# Patient Record
(demographics unavailable — no encounter records)

---

## 2024-11-04 NOTE — REASON FOR VISIT
[Initial Visit] : an initial visit for [FreeTextEntry2] : left clavicle/shoulder injury DOI: 11/03/2024

## 2024-11-04 NOTE — PHYSICAL EXAM
[de-identified] : General: Awake, alert, no acute distress, Patient was cooperative and appropriate during the examination.  The patient is of normal weight for height and age.  Walks without an antalgic gait.  Left shoulder Exam: Physical exam of the shoulder demonstrates normal skin without signs of skin changes or abnormalities. No erythema, warmth, or joint effusion appreciated.     Sensation intact light touch C5-T1 Palpable radial pulse Radial/ulnar/median/axillary/musculocutaneous/AIN/PIN nerves grossly intact   Range of motion: Forward Flexion: 160 Abduction: 130 External Rotation: 40 Internal Rotation: Lower lumbar level   Palpation: Not tender to palpation over the glenohumeral joint Moderately tender palpation over the rotator cuff insertion on the greater tuberosity Moderately tender to palpation over the AC joint Not tender to palpation of the biceps tendon/bicipital groove   Strength testing: Supraspinatus: 5/5 Infraspinatus: 5/5 Subscapularis: 5/5   Special test: Empty can test positive Pablo impingement test negative Speeds test negative Torrance's test negative Lift-off test negative Bell-press test negative Cross-arm adduction test positive   [de-identified] : X-rays 2 views of the left shoulder taken in the office today on 11/4/2024 shows no acute fracture or dislocation.

## 2024-11-04 NOTE — HISTORY OF PRESENT ILLNESS
[de-identified] : DOI: 11/03/2024 11/04/2024 : ERICKA OLIVERA  is a 48 year  old female who presents to the office for evaluation of the left shoulder.  She states last night she was up in the [place] and slipped and fell onto her left side and since then she has had pain and heard a crack and wanted to make sure she did not break something.  She states the pain is over the superior and lateral aspect of the shoulder and is worse certain movements and activities and alleviated with rest.  She is here for specialist opinion.  She denies any numbness or tingling distally.  She has no other complaints today.

## 2024-11-04 NOTE — DISCUSSION/SUMMARY
[de-identified] : Assessment: 48-year-old female with left AC joint sprain, rotator cuff strain   Plan: I had a long discussion with the patient today regarding the nature of their diagnosis and treatment plan. We discussed the risks and benefits of no treatment as well as nonoperative and operative treatments.  I reviewed the x-ray today that showed no acute bony pathology.  On examination today she has good range of motion and strength.  At this time I am recommending a trial of conservative treatment clued ice, heat, rest, over-the-counter anti-inflammatories, physical therapy on her own for symptomatic relief.  She was offered a prescription today but declined.  She will avoid exacerbating activities and will follow-up in 6 weeks as needed.  If symptoms were to persist we may consider advanced imaging in the future.  Patient seen and examined with Dr. Espino today.   The patient verbalizes their understanding and agrees with the plan.  All questions were answered to their satisfaction.

## 2025-06-06 NOTE — PROCEDURE
[de-identified] : Verbal consent was obtained and all questions, comments and concerns were addressed.  After right paraspinal trigger point in the affected area into superficial muscular trigger point was cleansed with alcohol prep X 3, ethyl chloride was used as local anesthetic.  Under sterile precautions 1cc of 1 percent lidocaine without epinephrine, plus 10 mg depomedrol, were instilled into the affected trigger points.  Patient tolerated procedure well. Dry sterile dressing was placed and patient described relief of pain 5 minutes after procedure was performed.

## 2025-06-06 NOTE — PHYSICAL EXAM
[Antalgic] : antalgic [de-identified] : CONSTITUTIONAL: The patient is a very pleasant individual who is well-nourished and who appears stated age. PSYCHIATRIC: The patient is alert and oriented X 3 and in no apparent distress, and participates with orthopedic evaluation well. HEAD: Atraumatic and is nonsyndromic in appearance. EENT: No visible thyromegaly, EOMI. RESPIRATORY: Respiratory rate is regular, not dyspneic on examination. LYMPHATICS: There is no inguinal lymphadenopathy INTEGUMENTARY: Skin is clean, dry, and intact about the bilateral lower extremities and lumbar spine. VASCULAR: There is brisk capillary refill about the bilateral lower extremities. NEUROLOGIC: There are no pathologic reflexes. There is no decrease in sensation of the bilateral lower extremities on manual  examination. Deep tendon reflexes are well maintained at 2+/4 of the bilateral lower extremities and are symmetric.. MUSCULOSKELETAL: There is no visible muscular atrophy. Manual motor strength is well maintained in the bilateral lower extremities. Range of motion of lumbar spine is well maintained. The patient ambulates in a non-myelopathic manner. Negative tension sign and straight leg raise bilaterally. Quad extension, ankle dorsiflexion, EHL, plantar flexion, and ankle eversion are well preserved. 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  AP pelvic x-ray and a left hip x-ray have been reviewed treatment of 3 views have been reviewed showing severe end-stage left hip degenerative joint disease

## 2025-06-06 NOTE — DISCUSSION/SUMMARY
[de-identified] : Patient tolerated a right paraspinal trigger point injection very well.  Oral steroids oral Toradol been prescribed physical therapy has also been prescribed.  Patient will follow-up on an as-needed basis with regard to lumbar myositis I think part of her lumbar myositis is also a component of an altered gait antalgic gait secondary to severe left hip arthritis she has been directed to the hip department here and/[facility] for discussion with regard to a left total hip replacement

## 2025-06-06 NOTE — HISTORY OF PRESENT ILLNESS
[de-identified] : Very pleasant 48-year-old female presents with an aggravation of underlying low back pain and known history of left degenerative joint disease of the hip.  She states her pain has been gradually getting worse over the last month or so.  She is helping her  recover from cervical myelopathy surgery so there is an increase in her repetitive bending lifting pulling twisting excetra no myelopathic signs and symptoms no lower extremity weakness or radiculopathy mainly right-sided low back pain and left inguinal crease pain/hip pain. [Worsening] : worsening [___ mths] : [unfilled] month(s) ago [4] : a current pain level of 4/10 [2] : an average pain level of 2/10 [1] : a minimum pain level of 1/10 [10] : a maximum pain level of 10/10 [Constant] : ~He/She~ states the symptoms seem to be constant [Walking] : worsened by walking [Weight Bearing] : worsened by weight bearing [Heat] : relieved by heat [NSAIDs] : relieved by nonsteroidal anti-inflammatory drugs [Rest] : relieved by rest [Ataxia] : no ataxia [Incontinence] : no incontinence [Loss of Dexterity] : good dexterity [Urinary Ret.] : no urinary retention

## 2025-07-28 NOTE — DISCUSSION/SUMMARY
[Medication Risks Reviewed] : Medication risks reviewed [Surgical risks reviewed] : Surgical risks reviewed [de-identified] : This is a 48 year old F pt presents today with bone-on-bone left hip osteoarthritis. The nature of condition and treatment options were discussed in detail. Patient is a candidate for left RAFI. I discussed the surgery with patient. She is interested in surgery in the future. At this time, I discussed conservative treatment such as cortisone injections, PT, anti-inflammatories, and low impact exercise. She has pain with prolonged sitting and standing. Patient defers from a IA cortisone injection. I sent a script for Toradol. She should continue to do low impact exercises. She may take Tylenol and NSAIDs as needed. F/u PRN.  The patient is a 48 year old individual with end stage arthritis of their right hip joint. The patient has exhausted a minimum of 3 months conservative treatment including prior injections, physical therapy, over the counter NSAIDS and pain medication where indicated. In addition, the patient's quality of life is diminished due to significant chronic pain. The patient is having difficulty with activities of daily living, including ambulating, descending stairs, and rising from a seated position. Based upon the patients continued symptoms and failure to respond to conservative treatment, I have recommended a right total hip replacement for this patient. A long discussion took place with the patient describing what a total joint replacement is and what the procedure would entail. A hip model, similar to the implant that will be used during the operation, was utilized to demonstrate and to discuss the various bearing surfaces of the implants. Implant fixation, press fit vs cemented, was also discussed with the patient. Bearing surfaces, including ceramic-on highly cross-linked polyethylene and metal on highly cross-linked polyethylene were discussed with the patient. Choices of implant manufacturers, mainly [employer] and [employer], were discussed and reviewed with preference to be made by patient and surgeon prior to operation. Final selection to be based on customary practice as well as preoperative templating with selection confirmed intraoperatively based on the patient's anatomy. The patient participated and agreed with the decision-making process. The hospitalization and post-operative care and rehabilitation were also discussed. The use of perioperative antibiotics and DVT prophylaxis were discussed. The risk, benefits and alternatives to surgical interventions were discussed at length with the patient. The patient was also advised of risks related to the medical comorbidities and elevated body mass index (BMI). A lengthy discussion took place to review the most common complications including but not limited to: deep vein thrombosis,pulmonary embolism, heart attack, stroke, infection, wound breakdown, numbness, damage to nerves, tendon, muscles, arteries or other blood vessels, death and other possible complications from anesthesia. The patient was told that we will take steps to minimize these risks by using sterile technique, antibiotics and DVT prophylaxis when appropriate and follow the patient postoperatively in the office setting to monitor progress. The possibility of recurrent pain, no improvement in pain and actual worsening of pain were also discussed with the patient.   The discharge plan of care focused on the patient going home following surgery. The patient was encouraged to make the necessary arrangements to have someone stay with them when they are discharged home. Following discharge, a home care therapist will visit the patient. The home care therapist will open your home care case and request home physical therapy services. Home physical therapy will commence following discharge provided it is appropriate and covered by the health insurance benefit plan.   The benefits of surgery were discussed with the patient including the potential for improving his/her current clinical condition through operative intervention. Alternatives to surgical intervention including continued conservative management were also discussed in detail. All questions were answered to the satisfaction of the patient. The treatment plan of care, as well as a model of a total hip equivalent to the one that will be used for their total joint replacement, was shared with the patient. The patient participated and agreed to the plan of care as well as the use of the recommended implants for their total hip replacement surgery.

## 2025-07-28 NOTE — END OF VISIT
[FreeTextEntry3] : Documented by Vinod Khan acting solely as a scribe for Dr. Mark Sanchez on this date 07/28/2025.   All Medical record entries made by the Scribe were at my, Dr. Mark Sanchez's, direction and personally dictated by me on 07/28/2025. I have reviewed the chart and agree that the record accurately reflects my personal performance of the history, physical exam, assessment and plan. I have also personally directed, reviewed, and agreed with the discharge instructions.

## 2025-07-28 NOTE — HISTORY OF PRESENT ILLNESS
[de-identified] : Patient is a 48-year-old female here for initial evaluation of left hip pain.  She reports she has had pain in the left hip for many years progressively worsening over the last 6 months.  She reports groin pain that radiates to the thigh and down the back of the leg that is worse with ambulation stairs and exercise. She does feel she walks with a limp which is causing some pain in her lower back. She reports the pain in her hip is affecting her activities of daily living.  She has pain with prolonged sitting and standing. She had Medrol dosepak for the pain which is helpful.

## 2025-07-28 NOTE — PHYSICAL EXAM
[de-identified] :  GENERAL APPEARANCE: Well nourished and hydrated, pleasant, alert, and oriented x 3. Appears their stated age.  HEENT: Normocephalic, extraocular eye motion intact. Nasal septum midline. Oral cavity clear. External auditory canal clear.  RESPIRATORY: Breath sounds clear and audible in all lobes. No wheezing, No accessory muscle use. CARDIOVASCULAR: No apparent abnormalities. No lower leg edema. No varicosities. Pedal pulses are palpable. NEUROLOGIC: Sensation is normal, no muscle weakness in the upper or lower extremities. DERMATOLOGIC: No apparent skin lesions, moist, warm, no rash. SPINE: Cervical spine appears normal and moves freely; thoracic spine appears normal and moves freely; lumbosacral spine appears normal and moves freely, normal, nontender. MUSCULOSKELETAL: Hands, wrists, and elbows are normal and move freely, shoulders are normal and move freely.        [de-identified] : Left hip exam positive Stinchfield maneuver range of motion flexion 100 degrees external rotation 45 internal rotation 15 degrees [de-identified] : Three-view imaging of the pelvis and left hip shows advanced bone-on-bone left hip osteoarthritis.  These x-rays were taken at a previous appointment but I did review the images myself and interpret them myself